# Patient Record
Sex: FEMALE | Race: BLACK OR AFRICAN AMERICAN | Employment: FULL TIME | URBAN - METROPOLITAN AREA
[De-identification: names, ages, dates, MRNs, and addresses within clinical notes are randomized per-mention and may not be internally consistent; named-entity substitution may affect disease eponyms.]

---

## 2017-06-30 ENCOUNTER — TRANSCRIBE ORDERS (OUTPATIENT)
Dept: ADMINISTRATIVE | Facility: HOSPITAL | Age: 57
End: 2017-06-30

## 2017-06-30 DIAGNOSIS — M25.572 LEFT ANKLE PAIN, UNSPECIFIED CHRONICITY: Primary | ICD-10-CM

## 2017-07-05 ENCOUNTER — HOSPITAL ENCOUNTER (OUTPATIENT)
Dept: MRI IMAGING | Facility: HOSPITAL | Age: 57
Discharge: HOME | End: 2017-07-05
Payer: OTHER MISCELLANEOUS

## 2017-07-05 DIAGNOSIS — M25.572 LEFT ANKLE PAIN, UNSPECIFIED CHRONICITY: ICD-10-CM

## 2017-07-05 PROCEDURE — 73721 MRI JNT OF LWR EXTRE W/O DYE: CPT

## 2018-01-16 NOTE — RESULT NOTES
Verified Results  (1) TISSUE EXAM [id number] 12:00AM Prisca Urbina     Test Name Result Flag Reference   LAB AP CASE REPORT (Report)     Surgical Pathology Report             Case: [id number]                   Authorizing Provider: Jus Power MD     Collected:      08/18/2016           Pathologist:      Jazmine Sharpe MD       Received:      08/24/2016 1116        Specimen:  Endometrium, Endometrial biopsy   LAB AP FINAL DIAGNOSIS (Report)     A  Endometrium (biopsy):    - Strips of inactive endometrium      - Admixed benign ectocervical squamous and endocervical glandular   mucosa with no significant pathologic abnormality     - No atypia, hyperplasia, dysplasia or malignancy identified  Electronically signed by Jazmine Sharpe MD on 8/26/2016 at 2:51 PM   LAB AP NOTE      Interpretation performed at [facility], [address] [address]  LAB AP SURGICAL ADDITIONAL INFORMATION (Report)     These tests were developed and their performance characteristics   determined by [facility] or [facility]  They may not be cleared or approved by the [place]  Food and   Drug Administration  The FDA has determined that such clearance or   approval is not necessary  These tests are used for clinical purposes  They should not be regarded as investigational or for research  This   laboratory has been approved by CLIA 88, designated as a high-complexity   laboratory and is qualified to perform these tests  LAB AP GROSS DESCRIPTION (Report)     A  The specimen is received in formalin, labeled with the patient's name   and hospital number, and is designated endometrial biopsy  The specimen   consists of multiple colorless to minute white tan pink, mucinous and soft   tissue fragments measuring in aggregate 0 8 x 0 6 x 0 1 cm  Due to   consistency of the specimen, it is questionable whether the specimen may   survive histologic processing  Entirely submitted   One cassette  Note: The estimated total formalin fixation time based upon information   provided by the submitting clinician and the standard processing schedule   is over 72 hours    MAS   LAB AP CLINICAL INFORMATION      Postmenopausal bleeding

## 2018-03-01 ENCOUNTER — OFFICE VISIT (OUTPATIENT)
Dept: OBGYN CLINIC | Facility: MEDICAL CENTER | Age: 58
End: 2018-03-01
Payer: COMMERCIAL

## 2018-03-01 VITALS — SYSTOLIC BLOOD PRESSURE: 130 MMHG | BODY MASS INDEX: 26.53 KG/M2 | WEIGHT: 164.4 LBS | DIASTOLIC BLOOD PRESSURE: 80 MMHG

## 2018-03-01 DIAGNOSIS — Z12.31 ENCOUNTER FOR SCREENING MAMMOGRAM FOR MALIGNANT NEOPLASM OF BREAST: ICD-10-CM

## 2018-03-01 DIAGNOSIS — Z01.419 ENCOUNTER FOR GYNECOLOGICAL EXAMINATION WITH PAPANICOLAOU SMEAR OF CERVIX: Primary | ICD-10-CM

## 2018-03-01 PROCEDURE — 87624 HPV HI-RISK TYP POOLED RSLT: CPT | Performed by: NURSE PRACTITIONER

## 2018-03-01 PROCEDURE — 88175 CYTOPATH C/V AUTO FLUID REDO: CPT | Performed by: NURSE PRACTITIONER

## 2018-03-01 PROCEDURE — S0612 ANNUAL GYNECOLOGICAL EXAMINA: HCPCS | Performed by: NURSE PRACTITIONER

## 2018-03-01 RX ORDER — LIDOCAINE 50 MG/G
OINTMENT TOPICAL
Refills: 3 | COMMUNITY
Start: 2017-11-29 | End: 2022-05-12

## 2018-03-01 RX ORDER — LIDOCAINE AND PRILOCAINE 25; 25 MG/G; MG/G
CREAM TOPICAL
Refills: 0 | COMMUNITY
Start: 2018-02-15 | End: 2022-05-12

## 2018-03-01 RX ORDER — LEVOTHYROXINE SODIUM 0.07 MG/1
TABLET ORAL
COMMUNITY
Start: 2015-03-23 | End: 2022-05-12

## 2018-03-01 RX ORDER — PAROXETINE HYDROCHLORIDE 20 MG/1
20 TABLET, FILM COATED ORAL DAILY
Refills: 0 | COMMUNITY
Start: 2018-01-15 | End: 2022-05-12

## 2018-03-01 RX ORDER — VALSARTAN 80 MG/1
TABLET ORAL
COMMUNITY
Start: 2015-03-23

## 2018-03-01 RX ORDER — IBUPROFEN 600 MG/1
1 TABLET ORAL EVERY 6 HOURS PRN
COMMUNITY
Start: 2015-03-30 | End: 2022-05-12

## 2018-03-01 RX ORDER — CELECOXIB 200 MG/1
200 CAPSULE ORAL
COMMUNITY
End: 2022-05-12

## 2018-03-01 NOTE — PROGRESS NOTES
ASSESSMENT & PLAN: Balwinder Nuñez is a 62 y o  female   with normal gynecologic exam     1   Routine well woman exam done today  2  Pap and HPV:  The patient's Pap and cotesting was done today  Current ASCCP Guidelines reviewed  3   Mammogram ordered, last done ~ 5 years ago  Stopped going b/c of false + results and unnecessary biopsy  4   Colonoscopy will check last date w [facility] pcp    4  The following were reviewed in today's visit: breast self exam, mammography screening ordered, menopause, adequate intake of calcium and vitamin D, exercise and healthy diet  5  rto one year, call sooner w any concerns  6  Urge uncontinence sxs d/w her and rec  She try kegel exercises  Written and verbal info on same was given   Will call if sxs persist and rto for another discussion  CC:  Annual Gynecologic Examination    HPI: Balwinder Nuñez is a 62 y o  No obstetric history on file  who presents for annual gynecologic examination  She has the following concerns:recently started noticing some urge incontinence sxs  Health Maintenance:    She wears her seatbelt routinely  She does not perform regular monthly self breast exams  She feels safe at home  Pap: done today  Last mammogram:given order  Last colonoscopy: will check date with pcp  No past medical history on file  Past Surgical History:   Procedure Laterality Date    COLONOSCOPY      Fiberoptic screening    DILATION AND CURETTAGE OF UTERUS         Past OB/Gyn History:  OB History     No data available        Menopausal at age 48, no bleeding since  History of sexually transmitted infection: No   History of abnormal pap smears: No      Patient is currently sexually active  heterosexual   The current method of family planning is post menopausal status      Family History   Problem Relation Age of Onset    Breast cancer Maternal Aunt      Unspecified laterality    Ovarian cancer Paternal Aunt        Social History:  Social History Social History    Marital status: /Civil Union     Spouse name: N/A    Number of children: N/A    Years of education: N/A     Occupational History    Not on file  Social History Main Topics    Smoking status: Current Some Day Smoker    Smokeless tobacco: Never Used    Alcohol use Yes      Comment: Social    Drug use: No    Sexual activity: Yes     Other Topics Concern    Not on file     Social History Narrative    No narrative on file     Presently lives with spouse  No Known Allergies    Current Outpatient Prescriptions:     ibuprofen (MOTRIN) 600 mg tablet, Take 1 tablet by mouth every 6 (six) hours as needed, Disp: , Rfl:     levothyroxine (SYNTHROID) 75 mcg tablet, Take by mouth, Disp: , Rfl:     valsartan (DIOVAN) 80 mg tablet, Take by mouth, Disp: , Rfl:     celecoxib (CeleBREX) 200 mg capsule, Take 200 mg by mouth, Disp: , Rfl:     lidocaine (XYLOCAINE) 5 % ointment, APPLY 5-7 FINGERTIP UNITS TO THE AFFECTED AREA 3 TIMES DAILY AS NEEDED (2 FINGERTIP UNITS = 1 GRAM), Disp: , Rfl: 3    lidocaine-prilocaine (EMLA) cream, APPLY EVERY DAY AS NEEDED, Disp: , Rfl: 0    PARoxetine (PAXIL) 20 mg tablet, Take 20 mg by mouth daily, Disp: , Rfl: 0      Review of Systems  Constitutional :no fever, feels well, no tiredness, no recent weight gain or loss  ENT: no ear ache, no loss of hearing, no nosebleeds or nasal discharge, no sore throat or hoarseness  Cardiovascular: no complaints of slow or fast heart beat, no chest pain, no palpitations, no leg claudication or lower extremity edema    Respiratory: no complaints of shortness of shortness of breath, no WASHBURN  Breasts:no complaints of breast pain, breast lump, or nipple discharge  Gastrointestinal: no complaints of abdominal pain, constipation,nausea, vomiting, or diarrhea or bloody stools  Genitourinary : no complaints of dysuria, incontinence, pelvic pain, no dysmenorrhea, vaginal discharge or abnormal vaginal bleeding and as noted in HPI   Integumentary: no complaints of skin rash or lesion, itching or dry skin  Neurological: no complaints of headache, no confusion, no numbness or tingling, no dizziness or fainting    Objective      /80   Wt 74 6 kg (164 lb 6 4 oz)   BMI 26 53 kg/m²     General:   appears stated age, cooperative, alert normal mood and affect   Neck: Neck: normal, supple,trachea midline, no masses   Heart: regular rate and rhythm, S1, S2 normal, no murmur, click, rub or gallop   Lungs: clear to auscultation bilaterally   Breasts: normal appearance, no masses or tenderness, No nipple retraction or dimpling   Abdomen: soft, non-tender, without masses or organomegaly   Vulva: normal female genitalia   Vagina: normal vagina, no discharge, exudate, lesion, or erythema   Urethra: normal   Cervix: Normal, no discharge     Uterus: normal size, contour, position, consistency, mobility, non-tender   Adnexa: normal adnexa

## 2018-03-05 LAB — HPV RRNA GENITAL QL NAA+PROBE: ABNORMAL

## 2018-03-09 LAB
LAB AP GYN PRIMARY INTERPRETATION: NORMAL
Lab: NORMAL

## 2021-04-06 ENCOUNTER — OFFICE VISIT (OUTPATIENT)
Dept: OBGYN CLINIC | Facility: MEDICAL CENTER | Age: 61
End: 2021-04-06
Payer: COMMERCIAL

## 2021-04-06 VITALS
BODY MASS INDEX: 24.59 KG/M2 | SYSTOLIC BLOOD PRESSURE: 120 MMHG | DIASTOLIC BLOOD PRESSURE: 80 MMHG | HEIGHT: 66 IN | WEIGHT: 153 LBS

## 2021-04-06 DIAGNOSIS — Z12.11 COLON CANCER SCREENING: ICD-10-CM

## 2021-04-06 DIAGNOSIS — R39.15 URINARY URGENCY: ICD-10-CM

## 2021-04-06 DIAGNOSIS — N81.10 BADEN-WALKER GRADE 1 CYSTOCELE: ICD-10-CM

## 2021-04-06 DIAGNOSIS — Z01.419 ENCOUNTER FOR WELL WOMAN EXAM WITH ROUTINE GYNECOLOGICAL EXAM: Primary | ICD-10-CM

## 2021-04-06 DIAGNOSIS — Z12.31 BREAST CANCER SCREENING BY MAMMOGRAM: ICD-10-CM

## 2021-04-06 PROCEDURE — G0145 SCR C/V CYTO,THINLAYER,RESCR: HCPCS | Performed by: PATHOLOGY

## 2021-04-06 PROCEDURE — 99386 PREV VISIT NEW AGE 40-64: CPT | Performed by: OBSTETRICS & GYNECOLOGY

## 2021-04-06 PROCEDURE — G0124 SCREEN C/V THIN LAYER BY MD: HCPCS | Performed by: PATHOLOGY

## 2021-04-06 PROCEDURE — 87624 HPV HI-RISK TYP POOLED RSLT: CPT | Performed by: OBSTETRICS & GYNECOLOGY

## 2021-04-06 PROCEDURE — 0503F POSTPARTUM CARE VISIT: CPT | Performed by: OBSTETRICS & GYNECOLOGY

## 2021-04-06 NOTE — PROGRESS NOTES
Assessment   61 y o  postmenopausal female who is sexually active presenting for annual exam      Plan   Diagnoses and all orders for this visit:    Encounter for well woman exam with routine gynecological exam  -     Liquid-based pap, screening  - Mammogram slip given  - Colon ca screening up to date  - Return in 1yr for yearly    Breast cancer screening by mammogram  -     Mammo screening bilateral w 3d & cad; Future    Urinary urgency  Moro-Walker grade 1 cystocele  -     Urine culture; Future  - Advised on finding and relationship to symptoms  - Recommended kegels, consideration of pelvic PT +/- topical estrogen  - Pamphlet provided on [facility]    Colon cancer screening  - Up to date    __________________________________________________________________      Subjective     61 y o  postmenopausal female who is sexually active presenting for annual exam  She is without complaint  GYN  Complaints: denies  Denies dyspareunia, genital discharge, genital ulcers, pelvic pain and vulvar/vaginal symptoms  Menopause occurred at age mid-55s  She has had no bleeding since this time  Menopausal symptoms: hot flashes  Sexually active: Yes - single partner - male  Hx STI: hx HPV , 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  Hx Abnormal pap: unsure    OB  [id number] ( x2, SABx2)   Pregnancy complications: denies      Complaints: more prominent urgency noted  Gradual worsening with age  No incontinence   Denies urinary frequency, hematuria, urinary incontinence and dysuria    BREAST  Complaints: denies  Denies: breast lump, breast tenderness, changed mole, dryness, nipple discharge, pruritus, rash, skin color change and skin lesion(s)  Last mammogram: needs to schedule  Personal hx: left breast bx  Family hx: denies fhx of uterine, ovarian cancers  Sister (60) and maternal aunt (30-40s) with breast ca  Brother with colon ca (58)  Patient does do regular self-exams    GENERAL  PMH reviewed/updated and is as below     Patient does follow with a PCP  SCREENING  Cervical Ca: pap collected  Breast Ca: mammo pending scheduling  Colon Ca: mid-50s had colonoscopy, told repeat 10yrs      Past Medical History:   Diagnosis Date    Arthritis     Hypertension     Hypothyroidism        Past Surgical History:   Procedure Laterality Date    COLONOSCOPY      Fiberoptic screening    DILATION AND CURETTAGE OF UTERUS           Current Outpatient Medications:     celecoxib (CeleBREX) 200 mg capsule, Take 200 mg by mouth, Disp: , Rfl:     ibuprofen (MOTRIN) 600 mg tablet, Take 1 tablet by mouth every 6 (six) hours as needed, Disp: , Rfl:     levothyroxine (SYNTHROID) 75 mcg tablet, Take by mouth, Disp: , Rfl:     lidocaine (XYLOCAINE) 5 % ointment, APPLY 5-7 FINGERTIP UNITS TO THE AFFECTED AREA 3 TIMES DAILY AS NEEDED (2 FINGERTIP UNITS = 1 GRAM), Disp: , Rfl: 3    lidocaine-prilocaine (EMLA) cream, APPLY EVERY DAY AS NEEDED, Disp: , Rfl: 0    valsartan (DIOVAN) 80 mg tablet, Take by mouth, Disp: , Rfl:     PARoxetine (PAXIL) 20 mg tablet, Take 20 mg by mouth daily, Disp: , Rfl: 0    No Known Allergies    Social History     Tobacco Use    Smoking status: Former Smoker    Smokeless tobacco: Never Used   Substance Use Topics    Alcohol use: Yes     Comment: Social    Drug use: No     Comment: smokes CBD, but prefers to use topical cream (arthritis)           Objective  /80   Ht 5' 6 25" (1 683 m)   Wt 69 4 kg (153 lb)   LMP  (LMP Unknown)   BMI 24 51 kg/m²      Physical Exam:  Physical Exam  Exam conducted with a chaperone present  Constitutional:       General: She is not in acute distress  Appearance: Normal appearance  She is well-developed  She is not ill-appearing, toxic-appearing or diaphoretic  HENT:      Head: Normocephalic and atraumatic  Eyes:      General: No scleral icterus  Right eye: No discharge  Left eye: No discharge        Conjunctiva/sclera: Conjunctivae normal    Cardiovascular:      Rate and Rhythm: Normal rate  Pulmonary:      Effort: Pulmonary effort is normal  No accessory muscle usage or respiratory distress  Chest:      Breasts:         Right: No inverted nipple, mass, nipple discharge, skin change or tenderness  Left: No inverted nipple, mass, nipple discharge, skin change or tenderness  Abdominal:      General: There is no distension  Palpations: Abdomen is soft  There is no mass  Tenderness: There is no abdominal tenderness  There is no guarding or rebound  Genitourinary:     General: Normal vulva  Exam position: Lithotomy position  Labia:         Right: No rash, tenderness or lesion  Left: No rash, tenderness or lesion  Vagina: No signs of injury  Prolapsed vaginal walls (mild laxity of anterior vaginal wall with prolapse, consistent with cystocele  pallor of tissue and loss of rugae) present  No vaginal discharge, erythema, tenderness or bleeding  Cervix: No cervical motion tenderness, discharge, friability or erythema  Uterus: Not deviated, not enlarged, not fixed and not tender  Adnexa:         Right: No mass, tenderness or fullness  Left: No mass, tenderness or fullness  Rectum: No external hemorrhoid  Normal anal tone  Comments: Urethral meatus: normal  Skin:     General: Skin is warm and dry  Findings: No erythema or rash  Neurological:      Mental Status: She is alert  Psychiatric:         Mood and Affect: Mood normal          Behavior: Behavior normal          Thought Content:  Thought content normal          Judgment: Judgment normal

## 2021-04-07 LAB
HPV HR 12 DNA CVX QL NAA+PROBE: POSITIVE
HPV16 DNA CVX QL NAA+PROBE: POSITIVE
HPV18 DNA CVX QL NAA+PROBE: NEGATIVE

## 2021-04-13 LAB
LAB AP GYN PRIMARY INTERPRETATION: ABNORMAL
Lab: ABNORMAL
PATH INTERP SPEC-IMP: ABNORMAL

## 2021-04-13 NOTE — RESULT ENCOUNTER NOTE
Patient has abnormal pap  Please call patient to notify her and assist her in scheduling colposcopy  We will review pap in detail at colpo visit

## 2021-05-03 ENCOUNTER — PROCEDURE VISIT (OUTPATIENT)
Dept: OBGYN CLINIC | Facility: MEDICAL CENTER | Age: 61
End: 2021-05-03
Payer: COMMERCIAL

## 2021-05-03 VITALS
BODY MASS INDEX: 24.59 KG/M2 | SYSTOLIC BLOOD PRESSURE: 160 MMHG | HEIGHT: 66 IN | WEIGHT: 153 LBS | DIASTOLIC BLOOD PRESSURE: 80 MMHG

## 2021-05-03 DIAGNOSIS — R87.610 ASCUS WITH POSITIVE HIGH RISK HPV CERVICAL: Primary | ICD-10-CM

## 2021-05-03 DIAGNOSIS — R87.810 ASCUS WITH POSITIVE HIGH RISK HPV CERVICAL: Primary | ICD-10-CM

## 2021-05-03 PROCEDURE — 57455 BIOPSY OF CERVIX W/SCOPE: CPT | Performed by: OBSTETRICS & GYNECOLOGY

## 2021-05-03 PROCEDURE — 88305 TISSUE EXAM BY PATHOLOGIST: CPT | Performed by: PATHOLOGY

## 2021-05-03 NOTE — PROGRESS NOTES
Colposcopy    Date/Time: 5/3/2021 11:40 AM  Performed by: Shabbir Harris MD  Authorized by: Shabbir Harris MD     Consent:     Consent obtained:  Written    Consent given by:  Patient    Procedural risks discussed:  Bleeding, damage to other organs, failure rate, infection and repeat procedure    Patient questions answered: yes      Patient agrees, verbalizes understanding, and wants to proceed: yes    Pre-procedure:     Prepped with: acetic acid    Indication:     Indication:  ASC-US (HPV16 and other +)  Procedure:     Procedure: Colposcopy w/ biopsy of cervix      Under satisfactory analgesia the patient was prepped and draped in the dorsal lithotomy position: yes      [place] speculum was placed in the vagina: yes      Under colposcopic examination the transition zone was seen in entirety: yes (tenaculum and dilation required)      Cervical biopsy performed with a cervical biopsy punch: yes (tischler forcep)      Monsel's solution was applied: yes      Biopsy(s): yes      Location:  10 oclock    Specimen to pathology: yes    Post-procedure:     Findings: White epithelium      Findings comment:  Focal change at 10 oclock    Impression: Low grade cervical dysplasia      Patient tolerance of procedure:   Tolerated well, no immediate complications

## 2021-09-24 ENCOUNTER — APPOINTMENT (EMERGENCY)
Dept: RADIOLOGY | Facility: HOSPITAL | Age: 61
End: 2021-09-24
Payer: COMMERCIAL

## 2021-09-24 ENCOUNTER — HOSPITAL ENCOUNTER (EMERGENCY)
Facility: HOSPITAL | Age: 61
Discharge: HOME | End: 2021-09-24
Attending: EMERGENCY MEDICINE | Admitting: EMERGENCY MEDICINE
Payer: COMMERCIAL

## 2021-09-24 VITALS
RESPIRATION RATE: 18 BRPM | TEMPERATURE: 98.6 F | HEART RATE: 60 BPM | OXYGEN SATURATION: 100 % | SYSTOLIC BLOOD PRESSURE: 171 MMHG | DIASTOLIC BLOOD PRESSURE: 67 MMHG

## 2021-09-24 DIAGNOSIS — S80.12XA CONTUSION OF LEFT LOWER EXTREMITY, INITIAL ENCOUNTER: ICD-10-CM
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
## 2021-09-24 PROCEDURE — 72170 X-RAY EXAM OF PELVIS: CPT

## 2021-09-24 PROCEDURE — 99284 EMERGENCY DEPT VISIT MOD MDM: CPT | Performed by: EMERGENCY MEDICINE

## 2021-09-24 PROCEDURE — 73552 X-RAY EXAM OF FEMUR 2/>: CPT

## 2021-09-24 PROCEDURE — 96372 THER/PROPH/DIAG INJ SC/IM: CPT

## 2021-09-24 PROCEDURE — 99283 EMERGENCY DEPT VISIT LOW MDM: CPT

## 2021-09-24 RX ORDER — KETOROLAC TROMETHAMINE 30 MG/ML
30 INJECTION, SOLUTION INTRAMUSCULAR; INTRAVENOUS ONCE
Status: COMPLETED | OUTPATIENT
Start: 2021-09-24 | End: 2021-09-24

## 2021-09-24 RX ADMIN — KETOROLAC TROMETHAMINE 30 MG: 30 INJECTION, SOLUTION INTRAMUSCULAR at 09:59

## 2021-09-24 NOTE — ED PROVIDER NOTES
History  Chief Complaint   Patient presents with    Fall     Pt  slipped in shower, fell hurting left upper thigh, and hit head on floor  No LOC, Pt  lost balance in shower  35-year-old female presents today for evaluation after a fall just prior to arrival   She states she slipped in the shower and lost her balance, falling onto her left side  She states she did hit her head  Denies LOC  She is reporting pain in her upper lip and her left thigh  She is not on any blood thinners  History provided by:  Patient  Fall  Mechanism of injury: fall    Injury location:  Leg  Leg injury location:  L upper leg  Incident location:  Bathroom  Arrived directly from scene: yes    Fall:     Fall occurred:  Tripped  Suspicion of alcohol use: no    Suspicion of drug use: no    Tetanus status:  Up to date  Associated symptoms: no abdominal pain, no chest pain, no difficulty breathing, no headaches, no loss of consciousness, no neck pain and no seizures    Risk factors: no anticoagulation therapy, no beta blocker therapy, no pacemaker and no steroid use        Prior to Admission Medications   Prescriptions Last Dose Informant Patient Reported? Taking?    PARoxetine (PAXIL) 20 mg tablet   Yes No   Sig: Take 20 mg by mouth daily   celecoxib (CeleBREX) 200 mg capsule   Yes No   Sig: Take 200 mg by mouth   ibuprofen (MOTRIN) 600 mg tablet   Yes No   Sig: Take 1 tablet by mouth every 6 (six) hours as needed   levothyroxine (SYNTHROID) 75 mcg tablet   Yes No   Sig: Take by mouth   lidocaine (XYLOCAINE) 5 % ointment   Yes No   Sig: APPLY 5-7 FINGERTIP UNITS TO THE AFFECTED AREA 3 TIMES DAILY AS NEEDED (2 FINGERTIP UNITS = 1 GRAM)   lidocaine-prilocaine (EMLA) cream   Yes No   Sig: APPLY EVERY DAY AS NEEDED   valsartan (DIOVAN) 80 mg tablet   Yes No   Sig: Take by mouth      Facility-Administered Medications: None       Past Medical History:   Diagnosis Date    Arthritis     Hypertension     Hypothyroidism        Past Surgical History:   Procedure Laterality Date    COLONOSCOPY      Fiberoptic screening    DILATION AND CURETTAGE OF UTERUS         Family History   Problem Relation Age of Onset    Breast cancer Maternal Aunt         Unspecified laterality    Ovarian cancer Paternal Aunt     Arthritis Mother     Breast cancer Sister     Colon cancer Brother      I have reviewed and agree with the history as documented  E-Cigarette/Vaping    E-Cigarette Use Never User      E-Cigarette/Vaping Substances     Social History     Tobacco Use    Smoking status: Current Every Day Smoker    Smokeless tobacco: Never Used   Vaping Use    Vaping Use: Never used   Substance Use Topics    Alcohol use: Yes     Comment: Social    Drug use: No     Comment: smokes CBD, but prefers to use topical cream (arthritis)       Review of Systems   Constitutional: Negative for chills, fatigue and fever  HENT: Negative for postnasal drip, sore throat and trouble swallowing  Eyes: Negative for visual disturbance  Respiratory: Negative for chest tightness and shortness of breath  Cardiovascular: Negative for chest pain and leg swelling  Gastrointestinal: Negative for abdominal pain  Genitourinary: Negative for dysuria  Musculoskeletal: Negative for neck pain  Skin: Negative for rash and wound  Allergic/Immunologic: Negative for immunocompromised state  Neurological: Negative for dizziness, seizures, loss of consciousness, light-headedness and headaches  Psychiatric/Behavioral: Negative for confusion  Physical Exam  Physical Exam  Vitals and nursing note reviewed  Constitutional:       Appearance: Normal appearance  HENT:      Head: Normocephalic and atraumatic  Nose: Nose normal       Mouth/Throat:      Mouth: Mucous membranes are moist    Eyes:      Extraocular Movements: Extraocular movements intact  Pupils: Pupils are equal, round, and reactive to light  Neck:      Comments:  The C-cspine was evaluated via Nexus criteria:  Focal Neuro Deficit: no  Midline Tenderness: no  AMS: no  Intoxication: no  Distracting Injury: no  The C-Spine was cleared    Cardiovascular:      Rate and Rhythm: Normal rate and regular rhythm  Pulses: Normal pulses  Heart sounds: Normal heart sounds  Pulmonary:      Effort: Pulmonary effort is normal       Breath sounds: Normal breath sounds  Abdominal:      General: Abdomen is flat  Palpations: Abdomen is soft  Musculoskeletal:      Cervical back: Normal range of motion and neck supple  Right upper leg: Normal       Left upper leg: Tenderness present  No swelling, edema, deformity, lacerations or bony tenderness  Legs:       Comments: Tender to palpation on the lateral left thigh  No hematoma palpable  Range of motion of the left knee is decreased due to pain in the left quadriceps  She is neurovascularly intact distally  Skin:     General: Skin is warm and dry  Capillary Refill: Capillary refill takes less than 2 seconds  Neurological:      General: No focal deficit present  Mental Status: She is alert and oriented to person, place, and time     Psychiatric:         Mood and Affect: Mood normal          Behavior: Behavior normal          Vital Signs  ED Triage Vitals [09/24/21 0936]   Temperature Pulse Respirations Blood Pressure SpO2   98 6 °F (37 °C) 60 18 (!) 171/67 100 %      Temp Source Heart Rate Source Patient Position - Orthostatic VS BP Location FiO2 (%)   Oral Monitor Lying Left arm --      Pain Score       7           Vitals:    09/24/21 0936   BP: (!) 171/67   Pulse: 60   Patient Position - Orthostatic VS: Lying         Visual Acuity      ED Medications  Medications   ketorolac (TORADOL) injection 30 mg (30 mg Intramuscular Given 9/24/21 0959)       Diagnostic Studies  Results Reviewed     None                 XR femur 2 views LEFT   ED Interpretation by Augustus Cruz DO (09/24 1042)   No acute process      Final Result by Jose Stover MD (09/24 1119)      No acute osseous abnormality  Workstation performed: [id number]         XR pelvis ap only 1 or 2 vw   ED Interpretation by Mehnaz Stanley DO (09/24 1042)   No acute process      Final Result by Jose Stover MD (09/24 1118)      No acute osseous abnormality  Workstation performed: [id number]                    Procedures  Procedures         ED Course                                           MDM  Number of Diagnoses or Management Options  Contusion of left lower extremity, initial encounter: new and requires workup  Fall, initial encounter: new and requires workup  Diagnosis management comments: 10:43 AM  Appears negative by my read  Patient feeling better after IM Toradol  Will attempt to ambulate and plan for discharge  Suspect left thigh contusion  I do not feel a palpable hematoma and there is no bruising on the leg at this time  Will re-evaluate and discharged home with compartment syndrome instructions to return  11:30 AM  Patient ambulated with me without difficulty  Left thigh remains soft without palpable hematoma  No concerns for compartment syndrome  Patient is stable for discharge  Follow up with PCP as an outpatient  Return to ED instructions reviewed         Amount and/or Complexity of Data Reviewed  Tests in the radiology section of CPT®: ordered and reviewed  Review and summarize past medical records: yes  Independent visualization of images, tracings, or specimens: yes    Risk of Complications, Morbidity, and/or Mortality  Presenting problems: low  Diagnostic procedures: low  Management options: low    Patient Progress  Patient progress: stable      Disposition  Final diagnoses:   Fall, initial encounter   Contusion of left lower extremity, initial encounter     Time reflects when diagnosis was documented in both MDM as applicable and the Disposition within this note     Time User Action Codes Description Comment 9/24/2021 10:58 AM Dash Shields Add [C71  Amedeo Angles Fall, initial encounter     9/24/2021 10:58 AM Dian aClderon Add [S80 12XA] Contusion of left lower extremity, initial encounter       ED Disposition     ED Disposition Condition Date/Time Comment    Discharge Stable Fri Sep 24, 2021 11:29  Ismael Love discharge to home/self care  Follow-up Information     Follow up With Specialties Details Why Marilu Billy MD Internal Medicine Schedule an appointment as soon as possible for a visit  As needed [id number]            Patient's Medications   Discharge Prescriptions    No medications on file     No discharge procedures on file      PDMP Review     None          ED Provider  Electronically Signed by           Juanis Barton DO  09/24/21 1131

## 2021-12-22 ENCOUNTER — NURSE TRIAGE (OUTPATIENT)
Dept: OTHER | Facility: OTHER | Age: 61
End: 2021-12-22

## 2021-12-22 DIAGNOSIS — Z20.828 SARS-ASSOCIATED CORONAVIRUS EXPOSURE: Primary | ICD-10-CM

## 2022-05-12 ENCOUNTER — ANNUAL EXAM (OUTPATIENT)
Dept: OBGYN CLINIC | Facility: MEDICAL CENTER | Age: 62
End: 2022-05-12
Payer: COMMERCIAL

## 2022-05-12 VITALS — BODY MASS INDEX: 25.36 KG/M2 | HEIGHT: 66 IN | WEIGHT: 157.8 LBS

## 2022-05-12 DIAGNOSIS — R10.2 PELVIC PAIN IN FEMALE: ICD-10-CM

## 2022-05-12 DIAGNOSIS — N81.4 CYSTOCELE WITH PROLAPSE: ICD-10-CM

## 2022-05-12 DIAGNOSIS — Z13.820 ENCOUNTER FOR OSTEOPOROSIS SCREENING IN ASYMPTOMATIC POSTMENOPAUSAL PATIENT: ICD-10-CM

## 2022-05-12 DIAGNOSIS — Z98.890 HISTORY OF COLPOSCOPY WITH CERVICAL BIOPSY: ICD-10-CM

## 2022-05-12 DIAGNOSIS — Z12.31 ENCOUNTER FOR SCREENING MAMMOGRAM FOR MALIGNANT NEOPLASM OF BREAST: ICD-10-CM

## 2022-05-12 DIAGNOSIS — Z12.11 COLON CANCER SCREENING: ICD-10-CM

## 2022-05-12 DIAGNOSIS — Z78.0 ENCOUNTER FOR OSTEOPOROSIS SCREENING IN ASYMPTOMATIC POSTMENOPAUSAL PATIENT: ICD-10-CM

## 2022-05-12 DIAGNOSIS — Z01.419 ENCOUNTER FOR WELL WOMAN EXAM WITH ROUTINE GYNECOLOGICAL EXAM: Primary | ICD-10-CM

## 2022-05-12 DIAGNOSIS — Z87.42 HISTORY OF ABNORMAL CERVICAL PAP SMEAR: ICD-10-CM

## 2022-05-12 PROCEDURE — 99396 PREV VISIT EST AGE 40-64: CPT | Performed by: OBSTETRICS & GYNECOLOGY

## 2022-05-12 PROCEDURE — G0476 HPV COMBO ASSAY CA SCREEN: HCPCS | Performed by: OBSTETRICS & GYNECOLOGY

## 2022-05-12 PROCEDURE — 0503F POSTPARTUM CARE VISIT: CPT | Performed by: OBSTETRICS & GYNECOLOGY

## 2022-05-12 PROCEDURE — G0145 SCR C/V CYTO,THINLAYER,RESCR: HCPCS | Performed by: OBSTETRICS & GYNECOLOGY

## 2022-05-12 RX ORDER — LEVOTHYROXINE SODIUM 112 MCG
112 TABLET ORAL DAILY
COMMUNITY
Start: 2022-04-15

## 2022-05-12 NOTE — PROGRESS NOTES
Assessment   64 y o  postmenopausal female who is sexually active presenting for annual exam      Plan   Diagnoses and all orders for this visit:    Encounter for well woman exam with routine gynecological exam  -     Liquid-based pap, screening  -     HPV High Risk  - Mammo ordered  -  Referral given for colonoscopy  - DEXA ordered  - Return in 1yr for yearly    History of abnormal cervical Pap smear  History of colposcopy with cervical biopsy  -     Liquid-based pap, screening  -     HPV High Risk    Encounter for screening mammogram for malignant neoplasm of breast  -     Mammo screening bilateral w 3d & cad; Future    Colon cancer screening  -     Ambulatory Referral to Gastroenterology; Future    Encounter for osteoporosis screening in asymptomatic postmenopausal patient  -     DXA bone density spine hip and pelvis; Future    Pelvic pain in female  -     US pelvis complete w transvaginal; Future    Cystocele with prolapse  - Reviewed finding and treatment options    __________________________________________________________________      Subjective     64 y o  postmenopausal female who is sexually active presenting for annual exam  She is without complaint  Reporting pelvic pain  Intermittent and without inciting event  Not specifically with sex  Hx ovarian cysts and worries she has another  Rupture on their own, no hx surgery  GYN  Complaints: as above  Denies dyspareunia, genital discharge, genital ulcers, pelvic pain and vulvar/vaginal symptoms  Menopause occurred at age mid-55s  She has had no bleeding since this time  Menopausal symptoms: hot flashes  Sexually active: Yes - single partner - male  Hx STI: hx HPV , 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  Hx Abnormal pap: , ascus (colpo - reactive vs low grade)  Last pap:  - ascus, hpv 16&other     OB  [id number] ( x2, SABx2)   Pregnancy complications: denies       Complaints: Urgency noted  Stable   No incontinence   Denies urinary frequency, hematuria, urinary incontinence and dysuria     BREAST  Complaints: denies  Denies: breast lump, breast tenderness, changed mole, dryness, nipple discharge, pruritus, rash, skin color change and skin lesion(s)  Last mammogram: reports mammo done here last year  Personal hx: left breast bx  Family hx: denies fhx of uterine, ovarian cancers  Sister (60) and maternal aunt (30-40s) with breast ca  Brother with colon ca (58)  Patient does do regular self-exams     GENERAL  PMH reviewed/updated and is as below  Patient does follow with a PCP  Semi-retired, 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[employer]  Walking for weight loss       SCREENING  Cervical Ca: pap collected  Breast Ca: 2021 by pt report; ordered   Colon Ca: mid-50s had colonoscopy, told repeat [id number]  Metabolic: 0750 - dexa - normal density      Past Medical History:   Diagnosis Date    Arthritis     Hypertension     Hypothyroidism        Past Surgical History:   Procedure Laterality Date    COLONOSCOPY      Fiberoptic screening    DILATION AND CURETTAGE OF UTERUS           Current Outpatient Medications:     Synthroid 112 MCG tablet, Take 112 mcg by mouth daily, Disp: , Rfl:     valsartan (DIOVAN) 80 mg tablet, Take by mouth, Disp: , Rfl:     No Known Allergies    Social History     Tobacco Use    Smoking status: Current Every Day Smoker    Smokeless tobacco: Never Used   Vaping Use    Vaping Use: Never used   Substance Use Topics    Alcohol use: Yes     Comment: Social    Drug use: No     Comment: smokes CBD, but prefers to use topical cream (arthritis)           Objective  Ht 5' 6 25" (1 683 m)   Wt 71 6 kg (157 lb 12 8 oz)   LMP  (LMP Unknown)   Breastfeeding No   BMI 25 28 kg/m²      Physical Exam:  Physical Exam  Exam conducted with a chaperone present  Constitutional:       General: She is not in acute distress  Appearance: Normal appearance  She is well-developed  She is not ill-appearing, toxic-appearing or diaphoretic  HENT:      Head: Normocephalic and atraumatic  Eyes:      General: No scleral icterus  Right eye: No discharge  Left eye: No discharge  Conjunctiva/sclera: Conjunctivae normal    Cardiovascular:      Rate and Rhythm: Normal rate  Pulmonary:      Effort: Pulmonary effort is normal  No accessory muscle usage or respiratory distress  Chest:   Breasts: Breasts are symmetrical       Right: No inverted nipple, mass, nipple discharge, skin change, tenderness or axillary adenopathy  Left: No inverted nipple, mass, nipple discharge, skin change, tenderness or axillary adenopathy  Abdominal:      General: There is no distension  Palpations: Abdomen is soft  There is no mass  Tenderness: There is no abdominal tenderness  There is no guarding or rebound  Genitourinary:     General: Normal vulva  Exam position: Lithotomy position  Labia:         Right: No rash, tenderness or lesion  Left: No rash, tenderness or lesion  Urethra: No prolapse, urethral swelling or urethral lesion  Vagina: No signs of injury  Prolapsed vaginal walls (gr 2 cystocele, prolapse of vaginal walls) present  No vaginal discharge, erythema, tenderness or bleeding  Cervix: No cervical motion tenderness, discharge, friability, lesion or erythema  Uterus: Not enlarged, not fixed and not tender  Adnexa:         Right: No mass, tenderness or fullness  Left: No mass, tenderness or fullness  Rectum: No external hemorrhoid  Normal anal tone  Comments: Urethral meatus: normal  Lymphadenopathy:      Upper Body:      Right upper body: No axillary or pectoral adenopathy  Left upper body: No axillary or pectoral adenopathy  Skin:     General: Skin is warm and dry  Coloration: Skin is not jaundiced  Findings: No bruising, erythema or rash  Neurological:      Mental Status: She is alert     Psychiatric:         Mood and Affect: Mood normal          Behavior: Behavior normal  Thought Content:  Thought content normal          Judgment: Judgment normal

## 2022-05-16 LAB
HPV HR 12 DNA CVX QL NAA+PROBE: NEGATIVE
HPV16 DNA CVX QL NAA+PROBE: NEGATIVE
HPV18 DNA CVX QL NAA+PROBE: NEGATIVE

## 2022-05-21 LAB
LAB AP GYN PRIMARY INTERPRETATION: NORMAL
Lab: NORMAL

## 2024-10-22 ENCOUNTER — HOSPITAL ENCOUNTER (OUTPATIENT)
Dept: MAMMOGRAPHY | Facility: MEDICAL CENTER | Age: 64
Discharge: HOME | End: 2024-10-22
Payer: COMMERCIAL

## 2024-10-22 VITALS — HEIGHT: 66 IN | BODY MASS INDEX: 25.37 KG/M2 | WEIGHT: 157.85 LBS

## 2024-10-22 DIAGNOSIS — Z12.31 ENCOUNTER FOR SCREENING MAMMOGRAM FOR MALIGNANT NEOPLASM OF BREAST: ICD-10-CM

## 2024-10-22 PROCEDURE — 77067 SCR MAMMO BI INCL CAD: CPT

## 2024-10-22 PROCEDURE — 77063 BREAST TOMOSYNTHESIS BI: CPT

## 2024-11-16 ENCOUNTER — HOSPITAL ENCOUNTER (OUTPATIENT)
Dept: ULTRASOUND IMAGING | Facility: CLINIC | Age: 64
Discharge: HOME | End: 2024-11-16
Payer: COMMERCIAL

## 2024-11-16 ENCOUNTER — HOSPITAL ENCOUNTER (OUTPATIENT)
Dept: MAMMOGRAPHY | Facility: CLINIC | Age: 64
Discharge: HOME | End: 2024-11-16
Payer: COMMERCIAL

## 2024-11-16 DIAGNOSIS — R92.8 ABNORMAL MAMMOGRAM: ICD-10-CM

## 2024-11-16 PROCEDURE — 77065 DX MAMMO INCL CAD UNI: CPT

## 2024-11-16 PROCEDURE — 76642 ULTRASOUND BREAST LIMITED: CPT

## 2024-11-16 PROCEDURE — G0279 TOMOSYNTHESIS, MAMMO: HCPCS

## 2025-08-10 ENCOUNTER — HOSPITAL ENCOUNTER (EMERGENCY)
Facility: HOSPITAL | Age: 65
Discharge: HOME | End: 2025-08-10
Attending: EMERGENCY MEDICINE | Admitting: EMERGENCY MEDICINE
Payer: COMMERCIAL

## 2025-08-10 ENCOUNTER — APPOINTMENT (EMERGENCY)
Dept: RADIOLOGY | Facility: HOSPITAL | Age: 65
End: 2025-08-10
Payer: COMMERCIAL